# Patient Record
Sex: FEMALE | Race: BLACK OR AFRICAN AMERICAN | NOT HISPANIC OR LATINO | Employment: OTHER | URBAN - METROPOLITAN AREA
[De-identification: names, ages, dates, MRNs, and addresses within clinical notes are randomized per-mention and may not be internally consistent; named-entity substitution may affect disease eponyms.]

---

## 2017-02-27 RX ORDER — PREDNISONE 5 MG/1
TABLET ORAL
Qty: 90 TABLET | Refills: 2 | Status: SHIPPED | OUTPATIENT
Start: 2017-02-27 | End: 2017-11-20 | Stop reason: SDUPTHER

## 2017-03-07 RX ORDER — TRAMADOL HYDROCHLORIDE 50 MG/1
TABLET ORAL
Qty: 60 TABLET | Refills: 3 | Status: SHIPPED | OUTPATIENT
Start: 2017-03-07 | End: 2017-09-11 | Stop reason: SDUPTHER

## 2017-03-09 RX ORDER — HYDROGEN PEROXIDE 3 %
20 SOLUTION, NON-ORAL MISCELLANEOUS DAILY
Qty: 90 CAPSULE | Refills: 3 | Status: SHIPPED | OUTPATIENT
Start: 2017-03-09

## 2017-04-20 ENCOUNTER — TELEPHONE (OUTPATIENT)
Dept: RHEUMATOLOGY | Facility: CLINIC | Age: 82
End: 2017-04-20

## 2017-04-20 DIAGNOSIS — M06.9 RHEUMATOID ARTHRITIS, INVOLVING UNSPECIFIED SITE, UNSPECIFIED RHEUMATOID FACTOR PRESENCE: Primary | ICD-10-CM

## 2017-04-20 RX ORDER — METHOTREXATE 2.5 MG/1
7.5 TABLET ORAL WEEKLY
Qty: 36 TABLET | Refills: 0 | Status: SHIPPED | OUTPATIENT
Start: 2017-04-20 | End: 2017-05-18 | Stop reason: SDUPTHER

## 2017-04-20 NOTE — TELEPHONE ENCOUNTER
Needs follow up apt and reg 4 labs     Refilled mtx X 1 script  Will not refill again until labs done and pt seen     Can see anyone

## 2017-04-21 NOTE — TELEPHONE ENCOUNTER
Call pt to let her know Briseida refill TMTX this once and scheduled labs and appt for 05/11/17. Pt daughter verbalized understanding.

## 2017-04-24 ENCOUNTER — TELEPHONE (OUTPATIENT)
Dept: RHEUMATOLOGY | Facility: CLINIC | Age: 82
End: 2017-04-24

## 2017-04-24 NOTE — TELEPHONE ENCOUNTER
----- Message from Sarah Robertson sent at 4/24/2017  2:51 PM CDT -----  Pt daughter(Eros)at [phone]//states pt is having a lot of pain all over//neck/back/legs and arms//is wanting to know if possible to be seen today//please call asap//sohan/jaquan

## 2017-04-24 NOTE — TELEPHONE ENCOUNTER
pts daughter called and stated that pt's back is giving her a lot of pain and is ready to see the pain management dr. Jose Guadalupe Leavitt appt made with Dr Jewell

## 2017-05-08 ENCOUNTER — OFFICE VISIT (OUTPATIENT)
Dept: PAIN MEDICINE | Facility: CLINIC | Age: 82
End: 2017-05-08
Payer: MEDICARE

## 2017-05-08 VITALS
DIASTOLIC BLOOD PRESSURE: 66 MMHG | HEIGHT: 62 IN | SYSTOLIC BLOOD PRESSURE: 133 MMHG | WEIGHT: 88 LBS | BODY MASS INDEX: 16.2 KG/M2 | RESPIRATION RATE: 16 BRPM | HEART RATE: 61 BPM

## 2017-05-08 DIAGNOSIS — M47.817 SPONDYLOSIS OF LUMBOSACRAL REGION WITHOUT MYELOPATHY OR RADICULOPATHY: Primary | ICD-10-CM

## 2017-05-08 DIAGNOSIS — M48.061 STENOSIS, SPINAL, LUMBAR: ICD-10-CM

## 2017-05-08 DIAGNOSIS — M43.17 SPONDYLOLISTHESIS OF LUMBOSACRAL REGION: ICD-10-CM

## 2017-05-08 PROCEDURE — 99203 OFFICE O/P NEW LOW 30 MIN: CPT | Mod: S$PBB,,, | Performed by: ANESTHESIOLOGY

## 2017-05-08 PROCEDURE — 99213 OFFICE O/P EST LOW 20 MIN: CPT | Mod: PBBFAC,PO | Performed by: ANESTHESIOLOGY

## 2017-05-08 PROCEDURE — 99999 PR PBB SHADOW E&M-EST. PATIENT-LVL III: CPT | Mod: PBBFAC,,, | Performed by: ANESTHESIOLOGY

## 2017-05-08 NOTE — LETTER
May 8, 2017      Briseida Leavitt PA-C  [address]  [place] [address]           [facility]  [address] [place] [address]  Phone: [phone]  Fax: [phone]          Patient: Ada Santo   MR Number: [id number]   YOB: 1931   Date of Visit: 5/8/2017       Dear Briseida Leavitt:    Thank you for referring Ada Santo to me for evaluation. Attached you will find relevant portions of my assessment and plan of care.    If you have questions, please do not hesitate to call me. I look forward to following Ada Santo along with you.    Sincerely,    Steven Jewell MD    Enclosure  CC:  No Recipients    If you would like to receive this communication electronically, please contact [email or web address] or [phone] to request more information on [employer] Link access.    For providers and/or their staff who would like to refer a patient to [facility], please contact us through our one-stop-shop provider referral line, [facility], at [phone].    If you feel you have received this communication in error or would no longer like to receive these types of communications, please e-mail [email or web address]

## 2017-05-08 NOTE — PROGRESS NOTES
Chief Pain Complaint:  Lower back pain, leg pain b/l    History of Present Illness:   This patient is a 85 y.o. female who presents today complaining of the above noted pain/s. The patient describes the pain as follows.    - duration of pain: 2 years   - timing: constant   - character: aching, sharp  - radiating, dermatomal: extends into bilateral lower extremities posteriorly  - antecedent trauma, prior spinal surgery: no prior trauma, prior lumbar surgery   - pertinent negatives: No fever, No chills, No weight loss, No bladder dysfunction, No bowel dysfunction, No saddle anesthesia  - pertinent positives: generalized nonspecific Lower Extremity weakness bilaterally    - medications, other therapies tried (physical therapy, injections):     >> Tylenol, Tramadol, gabapentin, norco    >> Has previously undergone Physical Therapy    >> Has NOT previously undergone spinal injection/s      Imaging / Labs / Studies (reviewed on 5/8/2017):      Results for orders placed during the hospital encounter of 12/04/15   MRI Lumbar Spine Without Contrast    Narrative MRI lumbar spine without contrast.12/04/15 16:15:05  History:  M54.41 Lumbago with sciatica, right side; M54.16 Radiculopathy, lumbar region  Standard multiplanar noncontrast MRI sequences of the lumbar spine.  The conus is located at the L1-L2 level.  No abnormal signal in distal cord.  The spinal canal is within normal limits.  Grade 1 spondylolisthesis.  Bilateral degenerative changes in facets.  Bilateral bony neural foraminal narrowing with possible bilateral L5 nerve root impingement.  T12-L1: Normal  L1-2: Normal.  L2-3: Normal.  L3-4: Desiccation disk with minimal the posterior margin changes of the facets.  Multiple bilateral simple appearing  L4-5: Renal cysts.  Circumferential disk bones.  Bilateral degenerative changes of the facets.  Moderate spinal stenosis.  Bilateral bony neural foraminal narrowing with possible but not definite L4 nerve root  impingement.  L5-S1:  Grade 1 spondylolisthesis.  Circumferential disk bulge.  Bilaterally and changes of the facets.  Moderate severe central spinal stenosis.  Bilateral bony neural foraminal narrowing with possible bilateral L5 nerve root impingements.              Results for orders placed during the hospital encounter of 12/14/15   X-Ray Lumbar Spine Flexion And Extension Only    Narrative Lateral flexion and extension lumbar spine.  Comparison 10/13/14.  Findings: There is grade 2 anterolisthesis of L4 on L5 which has mildly progressed since the prior study.  No significant change in alignment between flexion and extension.  Multilevel degenerative changes.  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         Results for orders placed during the hospital encounter of 10/13/14   X-Ray Lumbar Spine Ap And Lateral    Narrative Comparison: 03/06/14  Findings:  Allowing for differences in positioning and technique, there has been no significant interval change.  Previously described findings are again noted and stable in appearance.         Results for orders placed during the hospital encounter of 03/06/14   X-Ray Cervical Spine AP And Lateral    Narrative Findings:  Advanced changes of degenerative disk disease are demonstrated at the C6-7 level.  There is mild anterior subluxation of C5 upon C6. There is mild dextroscoliosis. No acute fractures are identified.  Degenerative changes are noted at the anterior   atlantoaxial articulation.  Lateral masses are not splayed.  Noted is narrowing of this articulation on the right.  Also noted is superior mediastinal widening with displacement of the trachea towards the right and central calcifications consistent with   goiter.  Similar changes were demonstrated on the chest x-ray of 3/6/12         Review of Systems:  CONSTITUTIONAL: patient denies any fever, chills, or weight loss  SKIN: patient denies any rash or itching  RESPIRATORY: patient reports  "dyspnea with exertion  GASTROINTESTINAL: patient reports diarrhea  GENITOURINARY: patient reports loss of bladder control    MUSCULOSKELETAL:  - patient complains of the above noted pain/s (see chief pain complaint)    NEUROLOGICAL:   - pain as above  - strength in Lower extremities is decreased, BILATERALLY  - sensation in Lower extremities is intact, BILATERALLY  - patient reports bladder incontinence      PSYCHIATRIC: patient denies any change in mood    Other:  All other systems reviewed and are negative      Physical Exam:  /66 (BP Location: Right arm, Patient Position: Sitting, BP Method: Automatic)  Pulse 61  Resp 16  Ht 5' 2" (1.575 m)  Wt 39.9 kg (88 lb)  BMI 16.1 kg/m2 (reviewed on 5/8/2017)  General: alert and oriented, in no apparent distress  Gait: normal gait  Skin: No rashes, No discoloration, No obvious lesions  HEENT: EOMI  Cardiovascular: no significant peripheral edema present  Respiratory: respirations nonlabored    Musculoskeletal:  - Any pain on flexion, extension, rotation:    >> pain on extension and rotation  - Straight Leg Raise:     >> LEFT :: negative    >> RIGHT :: negative    - Any tenderness to palpation across paraspinal muscles, joints, bursae:     >> across lumbar paraspinals    Neuro:  - Extremity Strength:     >> LEFT :: 5/5    >> RIGHT :: 5/5     Psych:  Mood and affect is appropriate      Assessment:  Lumbar Spondylosis  Spinal stenosis  Spondylolisthesis    Plan:  Patient with a history of RA and l-spine surgery presents complaining of low back pain, previously tried on tramadol and norco, referred for PT, referred to [facility] to see Dr. Martinez.  Uncertain what I have to offer beyond what she has been exposed to already.  I discuss medication dosing, I will send for aquatic therapy, I discuss injections.  RTC as needed.  Imaging / studies reviewed, detailed above.  I discussed in detail the risks, benefits, and alternatives to any and all potential treatment options.  All " questions and concerns were fully addressed today in clinic.      Disclaimer:  This note may have been prepared using voice recognition software, it may have not been extensively proofed, as such there could be errors within the text such as sound alike errors.

## 2017-05-11 ENCOUNTER — OFFICE VISIT (OUTPATIENT)
Dept: RHEUMATOLOGY | Facility: CLINIC | Age: 82
End: 2017-05-11
Payer: MEDICARE

## 2017-05-11 ENCOUNTER — HOSPITAL ENCOUNTER (OUTPATIENT)
Dept: RADIOLOGY | Facility: HOSPITAL | Age: 82
Discharge: HOME | End: 2017-05-11
Attending: INTERNAL MEDICINE
Payer: MEDICARE

## 2017-05-11 VITALS
WEIGHT: 88.19 LBS | HEIGHT: 62 IN | SYSTOLIC BLOOD PRESSURE: 104 MMHG | DIASTOLIC BLOOD PRESSURE: 58 MMHG | BODY MASS INDEX: 16.23 KG/M2 | HEART RATE: 75 BPM

## 2017-05-11 DIAGNOSIS — G89.29 CHRONIC PAIN OF RIGHT KNEE: ICD-10-CM

## 2017-05-11 DIAGNOSIS — M05.79 RHEUMATOID ARTHRITIS INVOLVING MULTIPLE SITES WITH POSITIVE RHEUMATOID FACTOR: Primary | ICD-10-CM

## 2017-05-11 DIAGNOSIS — M05.00 FELTY SYNDROME: ICD-10-CM

## 2017-05-11 DIAGNOSIS — M25.561 CHRONIC PAIN OF RIGHT KNEE: ICD-10-CM

## 2017-05-11 DIAGNOSIS — M17.11 PRIMARY OSTEOARTHRITIS OF RIGHT KNEE: ICD-10-CM

## 2017-05-11 DIAGNOSIS — M48.061 SPINAL STENOSIS OF LUMBAR REGION: ICD-10-CM

## 2017-05-11 DIAGNOSIS — Z51.81 MEDICATION MONITORING ENCOUNTER: ICD-10-CM

## 2017-05-11 DIAGNOSIS — R26.9 GAIT ABNORMALITY: ICD-10-CM

## 2017-05-11 DIAGNOSIS — M81.0 AGE-RELATED OSTEOPOROSIS WITHOUT CURRENT PATHOLOGICAL FRACTURE: ICD-10-CM

## 2017-05-11 DIAGNOSIS — M05.79 RHEUMATOID ARTHRITIS INVOLVING MULTIPLE SITES WITH POSITIVE RHEUMATOID FACTOR: ICD-10-CM

## 2017-05-11 DIAGNOSIS — M50.30 DDD (DEGENERATIVE DISC DISEASE), CERVICAL: ICD-10-CM

## 2017-05-11 DIAGNOSIS — M51.36 DDD (DEGENERATIVE DISC DISEASE), LUMBAR: ICD-10-CM

## 2017-05-11 PROCEDURE — 73562 X-RAY EXAM OF KNEE 3: CPT | Mod: TC,50,PO

## 2017-05-11 PROCEDURE — 73562 X-RAY EXAM OF KNEE 3: CPT | Mod: 26,LT,, | Performed by: RADIOLOGY

## 2017-05-11 PROCEDURE — 99214 OFFICE O/P EST MOD 30 MIN: CPT | Mod: S$PBB,,, | Performed by: PHYSICIAN ASSISTANT

## 2017-05-11 PROCEDURE — 99999 PR PBB SHADOW E&M-EST. PATIENT-LVL IV: CPT | Mod: PBBFAC,,, | Performed by: PHYSICIAN ASSISTANT

## 2017-05-11 PROCEDURE — 73562 X-RAY EXAM OF KNEE 3: CPT | Mod: 26,RT,, | Performed by: RADIOLOGY

## 2017-05-11 RX ADMIN — DENOSUMAB 60 MG: 60 INJECTION SUBCUTANEOUS at 04:05

## 2017-05-11 NOTE — PROGRESS NOTES
Subjective:       Patient ID: Ada Santo is a 85 y.o. female.    Chief Complaint: Rheumatoid Arthritis; ddd; felty; and spinal stenosis      HPI Comments: Ada is back today for rheumatology followup. RA felty syndrome, OA with severe spine involvment (spinal stenosis) and osteoporosis. chronic damage in her hands eric but no active swelling. She is on mtx 7.5 mg weekly and  prednisone 5 mg once a day. She is still having a lot of neck and lower back pain with radicular leg pain. Saw dr light. Will start PT. Discussed injections. She is unsure is she will do the injections .she want to do PT 1st. C/o right knee pain. No x-ray done. Pain with standing aching eric at night. More lateral compartment pain. Using tramadol and tylenol for pain. Off mobic since on Xeralt for  A fib.       She did have back surgery in the past but this was many many years ago.  Took Hydrocodone 5--325 in the past but it made her too sleepy, tramadol has done better. She is on gabapentin 300 mg at night.       Osteoporosis on PROLIA last done in 3/21/16 was due again after 9/21/16 (she cancelled this apt), dexa up to date 3/2016-stable and improved.      Hip Pain   Associated symptoms include arthralgias, myalgias, neck pain and numbness. Pertinent negatives include no abdominal pain, chest pain, chills, fatigue, fever, joint swelling, nausea, rash, vomiting or weakness.       Review of Systems   Constitutional: Negative.  Negative for activity change, appetite change, chills, fatigue and fever.   HENT: Negative.  Negative for mouth sores and trouble swallowing.         No dry mouth   Eyes: Negative.  Negative for photophobia, pain and redness.        No swollen or red eyes, no dry eye     Respiratory: Negative.  Negative for chest tightness, shortness of breath, wheezing and stridor.    Cardiovascular: Negative for chest pain.        A- fib    Gastrointestinal: Negative.  Negative for abdominal pain, blood in stool, diarrhea, nausea and  "vomiting.   Genitourinary: Negative.  Negative for dysuria, frequency, hematuria and urgency.   Musculoskeletal: Positive for arthralgias, back pain, gait problem, myalgias and neck pain. Negative for joint swelling and neck stiffness.        Right radicular pain    Skin: Negative.  Negative for color change, pallor and rash.   Neurological: Positive for numbness. Negative for weakness.   Hematological: Negative for adenopathy.   Psychiatric/Behavioral: Negative for suicidal ideas.         Objective:     BP (!) 104/58  Pulse 75  Ht 5' 2" (1.575 m)  Wt 40 kg (88 lb 2.9 oz)  BMI 16.13 kg/m2     Physical Exam   Constitutional: She is oriented to person, place, and time and well-developed, well-nourished, and in no distress. No distress.   HENT:   Head: Normocephalic and atraumatic.   Right Ear: External ear normal.   Left Ear: External ear normal.   Mouth/Throat: No oropharyngeal exudate.   Eyes: Conjunctivae and EOM are normal. Pupils are equal, round, and reactive to light. No scleral icterus.   Neck: Normal range of motion. Neck supple. No thyromegaly present.   Cardiovascular: Normal rate, regular rhythm and normal heart sounds.    No murmur heard.  Pulmonary/Chest: Effort normal and breath sounds normal. She exhibits no tenderness.   Abdominal: Soft. Bowel sounds are normal.       Right Side Rheumatological Exam     The patient is tender to palpation of the knee      Lymphadenopathy:     She has no cervical adenopathy.   Neurological: She is alert and oriented to person, place, and time. She displays normal reflexes. No cranial nerve deficit. She exhibits normal muscle tone. Gait normal.   Skin: Skin is warm and dry. No rash noted.     Musculoskeletal: Normal range of motion. She exhibits tenderness and deformity. She exhibits no edema.   Neg SLR bilaterally   No clonus or hyperreflexia  Crepitus both knees no swelling, warmth or erythema  Motion normal   Limited cervical and lumbar motion  Deformities in her " hands but no active synovitis  Limited motion noted to multiple joints  Mild kyphosis noted mid back  Crepitus in both knees, no swelling  Pain lateral compartment right knee                  Results for orders placed or performed in visit on 05/11/17   CBC auto differential   Result Value Ref Range    WBC 5.19 3.90 - 12.70 K/uL    RBC 4.58 4.00 - 5.40 M/uL    Hemoglobin 13.4 12.0 - 16.0 g/dL    Hematocrit 42.4 37.0 - 48.5 %    MCV 93 82 - 98 fL    MCH 29.3 27.0 - 31.0 pg    MCHC 31.6 (L) 32.0 - 36.0 %    RDW 14.2 11.5 - 14.5 %    Platelets 255 150 - 350 K/uL    MPV 10.7 9.2 - 12.9 fL    Gran # 3.8 1.8 - 7.7 K/uL    Lymph # 1.1 1.0 - 4.8 K/uL    Mono # 0.3 0.3 - 1.0 K/uL    Eos # 0.0 0.0 - 0.5 K/uL    Baso # 0.01 0.00 - 0.20 K/uL    Gran% 73.8 (H) 38.0 - 73.0 %    Lymph% 20.6 18.0 - 48.0 %    Mono% 4.8 4.0 - 15.0 %    Eosinophil% 0.6 0.0 - 8.0 %    Basophil% 0.2 0.0 - 1.9 %    Differential Method Automated          12/3/13 DEXA shows her T-scores -3.2 at the total hip, -3.3 at the femoral neck. Her bone density has improved about 4% since 2011 when it was falling   significantly. Bone mineral density LS-2.7 and stable, and it looks like it has a good trend upward compared to 2009 and 2011, now on Prolia. Degenerative   changes suggested in the lower left lumbar areas at L3, L4 and L5.      DEXA 3/3/16 total femur T score -3.2, femur neck -3.4, spine -3.5 impression osteoporosis stable and slightly improved bone density     Assessment:       1. Rheumatoid arthritis involving multiple sites with positive rheumatoid factor    2. Felty syndrome    3. Age-related osteoporosis without current pathological fracture    4. DDD (degenerative disc disease), cervical    5. DDD (degenerative disc disease), lumbar    6. Spinal stenosis of lumbar region    7. Primary osteoarthritis of right knee    8. Chronic pain of right knee    9. Medication monitoring encounter    10. Gait abnormality          1. Chronic rheumatoid arthritis  with felty chronic old damage but no activity on exam- stable in mtx 7.5 mg daily and prednisone 2.5 mg dialy   2. Osteoporosis on  PROLIA - missed last injection due  9/211/16- dexa up to date - will resume today   3. Degenerative disc disease /degenerative joint disease of the spine- severe to lumbar with radiculitis but cervical damage also-- now on gabapentin at night  ongoing pain- seen by dr light to start therapy soon   4. atrial fibrillation on Xeralto  5. Vaccines pt needs prevnar   6. Generalized OA  7. Right knee oa and pain- will send to pt, x-ray knee today and consider viscous injection  8. Gait abnormality- multifactorial according to all problems above    Plan:         X-ray both knee today  PT for right knee and aquatic thearpy  Consider viscous injection    Handicap tag and order for wheelchair for home use, her chronic deformity of her hands limited walker use    If her back is not better post pt get back to dr light for injection    Keep her gabapentin at night 300 mg    Continue her  tramadol  mg tid prn and ok to continue her tylenol    continue her prednisone 2.5 mg daily,   continue methotrexate 7.5 mg daily,      dexa 2 years, 3/2018  Prolia today and repeat again in 6 months    rtc 6 mon with labs- cbc, cmp, esr, crp and  prolia (auth entered today)     call with any questions, changes, or concerns.

## 2017-05-11 NOTE — PROGRESS NOTES
Administered 1cc Prolia 60mg/cc to LLQ of abdomen. Pt. Tolerated well. No acute reaction noted to site. Pt. Instructed on S/S of reaction to report. Pt. Verbalized understanding.    Lot:[id number]  Exp:06/19  Manu:[email or web address]

## 2017-05-11 NOTE — LETTER
May 11, 2017      Sanjay Blevins MD  [address] [place] [address]           [facility] - Rheumatology  [address]  [place] [address]  Phone: [phone]  Fax: [phone]          Patient: Ada Santo   MR Number: [id number]   YOB: 1931   Date of Visit: 5/11/2017       Dear Dr. Sanjay Blevins:    Thank you for referring Ada Santo to me for evaluation. Attached you will find relevant portions of my assessment and plan of care.    If you have questions, please do not hesitate to call me. I look forward to following Ada Santo along with you.    Sincerely,    Astrid Best, LPN    Enclosure  CC:  No Recipients    If you would like to receive this communication electronically, please contact [email or web address] or [phone] to request more information on [email or web address] Link access.    For providers and/or their staff who would like to refer a patient to [facility], please contact us through our one-stop-shop provider referral line, [facility], at [phone].    If you feel you have received this communication in error or would no longer like to receive these types of communications, please e-mail [email or web address]

## 2017-05-11 NOTE — MR AVS SNAPSHOT
[facility] Rheumatology  [address] [place] [address]  Phone: [phone]  Fax: [phone]                  Ada Santo   2017 3:30 PM   Office Visit    Description:  Female : 1931   Provider:  Briseida Leavitt PA-C   Department:  [facility] - Rheumatology           Reason for Visit     Rheumatoid Arthritis     ddd     felty     spinal stenosis           Diagnoses this Visit        Comments    Rheumatoid arthritis involving multiple sites with positive rheumatoid factor    -  Primary     Felty syndrome         Age-related osteoporosis without current pathological fracture         DDD (degenerative disc disease), cervical         DDD (degenerative disc disease), lumbar         Spinal stenosis of lumbar region         Primary osteoarthritis of right knee         Chronic pain of right knee         Medication monitoring encounter         Gait abnormality                To Do List           Future Appointments        Provider Department Dept Phone    2017 4:00 PM [facility] XR2 [facility] [phone]    2017 1:30 PM LABORATORY, [facility] [phone]    2017 2:00 PM Briseida Leavitt PA-C [facility] [phone]      Goals (5 Years of Data)     None      [facility]     [facility] Nurse Care Line -  Assistance  Unless otherwise directed by your provider, please contact [facility], our nurse care line that is available for  assistance.     Registered nurses in the [facility] provide: appointment scheduling, clinical advisement, health education, and other advisory services.  Call: [phone] (toll free)               Medications           Message regarding Medications     Verify the changes and/or additions to your medication regime listed below are the same as discussed with your clinician today.  If any of these changes or additions are incorrect, please notify your healthcare provider.       "  STOP taking these medications     duloxetine (CYMBALTA) 30 MG capsule Take 1 capsule (30 mg total) by mouth every morning.           Verify that the below list of medications is an accurate representation of the medications you are currently taking.  If none reported, the list may be blank. If incorrect, please contact your healthcare provider. Carry this list with you in case of emergency.           Current Medications     acetaminophen (TYLENOL) 500 MG tablet Take 500 mg by mouth.    ascorbic acid (VITAMIN C) 500 MG tablet Take 500 mg by mouth once daily.    BYSTOLIC 10 mg Tab     calcium carbonate (OS-KRISTINE) 600 mg (1,500 mg) Tab Take 600 mg by mouth 2 (two) times daily with meals.    digoxin (LANOXIN) 125 mcg tablet     esomeprazole (NEXIUM) 20 MG capsule Take 1 capsule (20 mg total) by mouth once daily.    folic acid (FOLVITE) 1 MG tablet TAKE ONE TABLET BY MOUTH TWICE DAILY    gabapentin (NEURONTIN) 300 MG capsule 300 mg nightly.    loratadine (CLARITIN) 10 mg tablet Take 10 mg by mouth once daily.    LUMIGAN 0.01 % Drop     methimazole (TAPAZOLE) 5 MG Tab     methotrexate 2.5 MG Tab Take 3 tablets (7.5 mg total) by mouth once a week.    multivitamin capsule Take 1 capsule by mouth once daily.    predniSONE (DELTASONE) 5 MG tablet TAKE ONE TABLET BY MOUTH ONE TIME DAILY     rivaroxaban (XARELTO) 15 mg Tab Take 10 mg by mouth once daily.    tramadol (ULTRAM) 50 mg tablet take 1 to 2 tablets by mouth three times a day as needed for pain    vitamin D 1000 units Tab Take 185 mg by mouth once daily.           Clinical Reference Information           Your Vitals Were     BP Pulse Height Weight BMI    104/58 75 5' 2" (1.575 m) 40 kg (88 lb 2.9 oz) 16.13 kg/m2      Blood Pressure          Most Recent Value    BP  (!)  104/58      Allergies as of 5/11/2017     Iodinated Contrast Media - Oral And Iv Dye    Iodine And Iodide Containing Products    Sulfa (Sulfonamide Antibiotics)    Cortisone      Immunizations " Administered on Date of Encounter - 5/11/2017     None      Orders Placed During Today's Visit      Normal Orders This Visit    Ambulatory Referral to Physical/Occupational Therapy     Prior Authorization Order     WHEELCHAIR FOR HOME USE     Future Labs/Procedures Expected by Expires    X-Ray Knee 3 View Bilateral  5/11/2017 5/11/2018      [facility] Sign-Up     Activating your [facility] account is as easy as 1-2-3!     1) Visit [email or web address], select Sign Up Now, enter this activation code and your date of birth, then select Next.  [email or web address]  Expires: 6/25/2017  3:56 PM      2) Create a username and password to use when you visit [facility] in the future and select a security question in case you lose your password and select Next.    3) Enter your e-mail address and click Sign Up!    Additional Information  If you have questions, please e-mail [email or web address] or call [phone] to talk to our [facility] staff. Remember, [facility] is NOT to be used for urgent needs. For medical emergencies, dial 911.         Language Assistance Services     ATTENTION: Language assistance services are available, free of charge. Please call [phone].      ATENCIÓN: Si mora arden, tiene a ball disposición servicios gratuitos de asistencia lingüística. Llame al [phone].     CHÚ Ý: N?u b?n nói Ti?ng Vi?t, có các d?ch v? h? tr? ngôn ng? mi?n phí dành cho b?n. G?i s? [phone].         [facility] - [facility] complies with applicable Federal civil rights laws and does not discriminate on the basis of race, color, national origin, age, disability, or sex.

## 2017-05-18 DIAGNOSIS — M06.9 RHEUMATOID ARTHRITIS, INVOLVING UNSPECIFIED SITE, UNSPECIFIED RHEUMATOID FACTOR PRESENCE: ICD-10-CM

## 2017-05-18 RX ORDER — METHOTREXATE 2.5 MG/1
7.5 TABLET ORAL WEEKLY
Qty: 36 TABLET | Refills: 1 | Status: SHIPPED | OUTPATIENT
Start: 2017-05-18

## 2017-07-05 RX ORDER — FOLIC ACID 1 MG/1
TABLET ORAL
Qty: 60 TABLET | Refills: 11 | Status: SHIPPED | OUTPATIENT
Start: 2017-07-05

## 2017-09-12 RX ORDER — TRAMADOL HYDROCHLORIDE 50 MG/1
TABLET ORAL
Qty: 60 TABLET | Refills: 0 | Status: SHIPPED | OUTPATIENT
Start: 2017-09-12 | End: 2017-10-17 | Stop reason: SDUPTHER

## 2017-10-04 DIAGNOSIS — M81.0 AGE-RELATED OSTEOPOROSIS WITHOUT CURRENT PATHOLOGICAL FRACTURE: Primary | ICD-10-CM

## 2017-10-17 RX ORDER — TRAMADOL HYDROCHLORIDE 50 MG/1
TABLET ORAL
Qty: 60 TABLET | Refills: 0 | Status: SHIPPED | OUTPATIENT
Start: 2017-10-17

## 2017-11-20 RX ORDER — PREDNISONE 5 MG/1
5 TABLET ORAL DAILY
Qty: 90 TABLET | Refills: 3 | Status: SHIPPED | OUTPATIENT
Start: 2017-11-20